# Patient Record
Sex: MALE | Race: AMERICAN INDIAN OR ALASKA NATIVE
[De-identification: names, ages, dates, MRNs, and addresses within clinical notes are randomized per-mention and may not be internally consistent; named-entity substitution may affect disease eponyms.]

---

## 2019-03-03 ENCOUNTER — HOSPITAL ENCOUNTER (EMERGENCY)
Dept: HOSPITAL 5 - ED | Age: 34
Discharge: HOME | End: 2019-03-03
Payer: COMMERCIAL

## 2019-03-03 VITALS — SYSTOLIC BLOOD PRESSURE: 136 MMHG | DIASTOLIC BLOOD PRESSURE: 76 MMHG

## 2019-03-03 DIAGNOSIS — L03.317: Primary | ICD-10-CM

## 2019-03-03 PROCEDURE — 99282 EMERGENCY DEPT VISIT SF MDM: CPT

## 2019-03-03 NOTE — EMERGENCY DEPARTMENT REPORT
Abscess Boil HPI





- HPI


Chief Complaint: Skin/Abscess/Foreign Body


Stated Complaint: CYST


Time Seen by Provider: 03/03/19 12:36


Duration: 5 Days


Location: Sacral/Pilonidal


Severity: Mild


History: Yes Pain, Yes Previous History, No Fever, No Purulent Drainage, No 

Numbness, No Foreign Body, No Insect Bite


HPI: Patient is a 33-year-old -American male who comes to the ER with 

right buttocks pain.  Patient has a history of chronic pilonidal cyst dating 

back to the age of 2.  He has been on chronic antibiotics.  He has seen numerous

general surgeons.  He does not have an abscess today it is a cellulitic area.  

He has no fever


Home Medications: 


                                  Previous Rx's











 Medication  Instructions  Recorded  Last Taken  Type


 


cephALEXin [Keflex] 500 mg PO Q12HR #20 cap 03/03/19 Unknown Rx


 


traMADol [Ultram] 50 mg PO Q6HR PRN #12 tablet 03/03/19 Unknown Rx











Allergies/Adverse Reactions: 


                                    Allergies











Allergy/AdvReac Type Severity Reaction Status Date / Time


 


No Known Allergies Allergy   Unverified 03/03/19 12:16














ED Review of Systems


ROS: 


Stated complaint: CYST


Other details as noted in HPI





Comment: All other systems reviewed and negative


Constitutional: denies: chills, fever


Eyes: denies: eye pain


ENT: denies: ear pain


Respiratory: denies: see HPI


Cardiovascular: denies: palpitations


Endocrine: denies: flushing


Gastrointestinal: denies: abdominal pain, nausea


Genitourinary: denies: urgency


Musculoskeletal: denies: back pain


Skin: as per HPI, lesions.  denies: rash


Neurological: denies: headache


Psychiatric: denies: depression


Hematological/Lymphatic: denies: easy bleeding





ED Past Medical Hx





- Past Medical History


Previous Medical History?: Yes


Additional medical history: PILON. CYSTS SINCE 2 YEARS OF AGE





- Surgical History


Past Surgical History?: Yes


Additional Surgical History: cystectomy from buttocks





- Social History


Smoking Status: Never Smoker


Substance Use Type: None





- Medications


Home Medications: 


                                Home Medications











 Medication  Instructions  Recorded  Confirmed  Last Taken  Type


 


cephALEXin [Keflex] 500 mg PO Q12HR #20 cap 03/03/19  Unknown Rx


 


traMADol [Ultram] 50 mg PO Q6HR PRN #12 tablet 03/03/19  Unknown Rx














ED Abscess Boil Physical Exam





- Exam


General: 


Vital signs noted. No distress. Alert and acting appropriately.





Exam: Yes Tenderness, Yes Surrounding Cellulites/Erythema, Yes Normal Neurologic

 Exam, Yes Normal Circulation, No Fluctuance, No Lymphangitis, No Crepitation, 

No Heart Murmur





ED Course


                                   Vital Signs











  03/03/19





  12:14


 


Temperature 98.1 F


 


Pulse Rate 69


 


Respiratory 16





Rate 


 


Blood Pressure 136/76


 


O2 Sat by Pulse 99





Oximetry 











Critical care attestation.: 


If time is entered above; I have spent that time in minutes in the direct care 

of this critically ill patient, excluding procedure time.








ED Medical Decision Making





- Medical Decision Making





The area is cellulitic in nature.  It is not abscessed.  Performing an I&D on 

this at this time would be coming through disease tissue.  Patient states that 

he knew we would say that when he was in pain.  I will start patient on 

antibiotics and have him do Epsom salt soaks hoping that the area will come to a

 head.  Patient verbalizes understanding.  Patient also states that he has been 

on antibiotics over and over.  He states that Bactrim does not work.  For that 

reason on Benadryl and Keflex.  Patient will be discharged home on Keflex and 

with Ultram for pain.  He will be following up with his primary care physician





ED Disposition


Clinical Impression: 


 Cellulitis





Disposition: DC-01 TO HOME OR SELFCARE


Is pt being admited?: No


Does the pt Need Aspirin: No


Condition: Stable


Instructions:  Abscess (ED)


Prescriptions: 


cephALEXin [Keflex] 500 mg PO Q12HR #20 cap


traMADol [Ultram] 50 mg PO Q6HR PRN #12 tablet


 PRN Reason: Pain


Referrals: 


[facility] [Outside] - 3-5 Days


Time of Disposition: 12:45